# Patient Record
(demographics unavailable — no encounter records)

---

## 2017-10-20 NOTE — PRG
DATE OF SERVICE:  10/20/2017

 

TIME:  1202

 

EGA ESTIMATION

 

Once again, we have used an EDC of 2016 based on first trimester ultrasound per Dr. Vasquez.  
at puts her now at 32 weeks and 2 days.  As she has a known previa, we will administer Celestone and
 monitor for now.  There is no evidence of vaginal bleeding currently.  Her transvaginal ultrasound 
was normal with a cervical length of 5 cm.  Therefore, there is no evidence of  labor at this
 time.  Dr. Vasquez has asked us to assume care as he is unavailable for patient management today.  Th
is was by personal communication with him.

## 2017-10-20 NOTE — HP
DATE:  10/20/2017

 

TIME OF EVALUATION:  1120

 

LOCATION:  Labor and Delivery, LDR 6

 

This is a patient of Dr. Vasquez, who has requested that we assumed care for this 
patient per direct communication.



PATIENT HAS FULL H&P HANDWRITTEN IN CHART.

 

REASON FOR EVALUATION:  Suspected vaginal bleeding episodes, status post coitus 
with a partial previa by history.

 

HISTORY OF PRESENT ILLNESS:  In brief, this is a 24-year-old -American 
 with 1 prior miscarriage with no prior  history, who is at 32 
weeks and 1 day by EDC of 2016, which was by 14-15 week ultrasound per 
Dr. Vasquez.  This was also seen in the prenatal record.  She states that she was 
given an ultrasound about 2 weeks ago which showed a possible previa.  She had 
intercourse at approximately midnight this morning and vaginal bleeding 
occurred at 0700.  She denies leakage of fluid or regular contractions.  She 
denies fevers or any abdominal trauma.  

 

REVIEW OF SYSTEMS:  Complete review of systems was checked and is significant 
only for the vaginal bleeding after intercourse.  This patient was transferred 
from the [facility] earlier, when I attempt to call at about 9:30 from the 
transfer center.  The patient has now arrived in triage.

 

ALLERGIES:  None.

 

PAST SURGICAL HISTORY:  None.

 

OB HISTORY:  Significant for vaginal deliveries.

 

PHYSICAL EXAMINATION:

VITAL SIGNS:  Blood pressure is 104/61, pulse is 81 and she is afebrile with a 
temperature of 98.5.

GENERAL:  Clinically, she is in no acute distress.

ABDOMEN:  Size is equal to dates. 

 

Ultrasound was in the room during my history taking.  Ultrasound revealed size 
equal to dates, but the EFW was still pending as the machine had been turned 
off prior to generation of the report.  There is a posterior placenta previa 
which is noted.  Again, Ultrasound in the room during my history taking.  In 
brief, the ultrasound did confirm size equal to dates, cephalic presentation, 
normal amniotic fluid.  There is a partial previa which is posterior.  Cervical 
length was 5 cm.  

 

Next on fetal monitor, fetal heart tones are in the 140s to 150s with moderate 
variability.  There are accelerations at initial evaluation.  Contractions were 
few and there was more uterine irritability than true contraction pattern.

 

ASSESSMENT:  This is a patient at 32 weeks and 1 day with known previa with a 
post coital vaginal bleed.

 

PLAN:

1.  Observation in Labor and Delivery.

2.  IV hydration.

3.  Celestone for fetal lung maturation.

4.  No evidence of acute bleed now.

5.  RhoGAM if Rh negative.

6.  Observation for now.

7.  Regular diet allowed.

 

MTDD

## 2017-10-20 NOTE — DIS
In brief, this is a patient that we have been watching since this morning, who was transferred here 
for evaluation, from the [place] [facility].  

 

In brief, this is a patient who had an episode of vaginal bleeding, but is otherwise stable.  There 
is no evidence of  labor as her cervical length is normal.  The patient has requested a stron
g desire to go home due to  issues.  Although we have requested that she stay for 23-hour 
observation, that is not possible due to  at home.  I discussed with her that as she is no
t actively bleeding, with a normal cervical length, and reassuring fetal heart tones, she can be dis
charged home after this current bag of fluid.  She is to come back tomorrow for steroid #2 administr
ation and she was also told to avoid intercourse.  We will send the patient home at her request, alt
ant[place] we have recommended that she stay.  Risks and benefits discussed with the patient.

## 2017-10-20 NOTE — ULT
OB ULTRASOUND:

 

10/20/2017

 

HISTORY:

Bleeding after intercourse.  Evaluate cervical length and placental location due to bleeding.

 

COMPARISON:

10/04/2017

 

 

FINDINGS:

Again noted is a single intrauterine gestation, in cephalic presentation.  Cardiac Doppler demonstra
nabil fetal heart tones with a fetal heart rate of 126 BPM.  The placenta is located posteriorly, and 
the leading edge of the placenta extends to the level of the cervix and has the appearance most sugg
estive of a partial placental previa.  The cervical length is 5.3 cm.

 

There is a normal amount of amniotic fluid with an amniotic fluid index of 14.l5 cm.

 

Fetal measurements:

Biparietal diameter 7.34 cm (29 weeks and 3 days).

Head circumference:  28.18 cm (30 weeks and 6 day).

Abdominal circumference:  25.93 cm (30 weeks and 1 day).

Femur length:  5.94 cm (31 weeks).

 

ESTIMATED GESTATIONAL AGE BY ULTRASOUND:  30 weeks and 3 days.

 

ESTIMATED DATE OF DELIVERY:  12/26/2017

 

This does correlate with a gestational age by the last menstrual period of 30 weeks and 5 days.

 

The estimated fetal weight by ultrasound is 1565 g (3 lbs 7 oz).  This represents the 27th percentil
e for fetal weight.  There has been interval growth when compared to prior exam.

 

IMPRESSION:

1.  Partial placenta previa.

 

2.  Single intrauterine gestation, in cephalic presentation, with fetal heart tones documented.

 

3.  Amniotic fluid index measures 14.7 cm.

 

4.  Estimated gestational age by ultrasound is 30 weeks and 3 days with an estimated date of deliver
y of 12/26/2017.

 

5.  Estimated fetal weight 1565 g (3 lbs 7 oz).

 

Dr. Benjamin was present during this examination and is aware of partial placenta previa.

 

POS: [facility]

## 2023-06-16 NOTE — PRG
DATE OF SERVICE:  10/21/2017

 

CHIEF COMPLAINT:  Dose #2 of Celestone.

 

HISTORY OF PRESENT ILLNESS:  At the time of presentation, Ms. Roque is a 24-year-old  4, par
a 2 female who sees Dr. Vasquez for prenatal care.  She has a known posterior previa.  She is presenti
 for dose #2 of her betamethasone, dose #1 having been given yesterday.  The patient denies any cr
amping or vaginal bleeding.  She reports good fetal movement.

 

REVIEW OF SYSTEMS:  Limited review of systems per HPI.

 

HISTORY:  Please see labor and delivery admission record included by reference.

 

PHYSICAL EXAMINATION:

VITAL SIGNS:  Blood pressure 111/63, pulse 85, respiratory rate 18, and temperature 98.6.

GENERAL:  Nontoxic appearing female in no acute distress.

OBSTETRIC:  Fetal heart tracing is category 1 and tocodynamometer is quiet.

 

ASSESSMENT AND PLAN:

1.  A 32-week 3-day intrauterine pregnancy with category 1 fetal tracing.

2.  Known posterior placenta previa presenting for dose #2 of betamethasone.  Betamethasone was admi
nistered.  The patient was discharged to home with routine obstetric and bleeding precautions and en
couraged to keep her next scheduled appointment with Dr. Art Vasquez.
Monthly or less